# Patient Record
Sex: MALE | Race: WHITE | URBAN - METROPOLITAN AREA
[De-identification: names, ages, dates, MRNs, and addresses within clinical notes are randomized per-mention and may not be internally consistent; named-entity substitution may affect disease eponyms.]

---

## 2017-02-19 ENCOUNTER — TELEPHONE (OUTPATIENT)
Dept: FAMILY MEDICINE | Facility: CLINIC | Age: 52
End: 2017-02-19

## 2017-02-19 DIAGNOSIS — I10 HYPERTENSION GOAL BP (BLOOD PRESSURE) < 140/90: ICD-10-CM

## 2017-02-19 NOTE — LETTER
[address] [address]  Phone: [phone]   February 27, 2017    Diaz Sotelo  [address] [place] [id number]      Dear Diaz,    We have been trying to reach you regarding your recent refill request.     Per Dr. Teixeira: pt hasn't been seen here since 9/23/2015 - Please assist pt in making appt to be seen in the next month. Refill done x 1 month only. Pt needs recheck office visit before more refills. Please inform pt and Please assist pt in making appt to be seen - ok for a 15 min appt.     Sincerely,         Renee Teixeira M.D.

## 2017-02-20 RX ORDER — AMLODIPINE AND BENAZEPRIL HYDROCHLORIDE 5; 10 MG/1; MG/1
CAPSULE ORAL
Qty: 30 CAPSULE | Refills: 0 | Status: SHIPPED | OUTPATIENT
Start: 2017-02-20 | End: 2017-03-28

## 2017-02-20 NOTE — TELEPHONE ENCOUNTER
BP Readings from Last 3 Encounters:   09/23/15 124/76   02/16/15 (!) 130/96   01/05/15 136/86   pt hasn't been seen here since 9/23/2015 -  Please assist pt in making appt to be seen in the next month.   done x 1 month only. Pt needs recheck office visit before more refills. Please inform pt and  Please assist pt in making appt to be seen - ok for a 15 min appt.

## 2017-02-20 NOTE — TELEPHONE ENCOUNTER
amLODIPine-benazepril (LOTREL) 5-10 MG per capsule  Last Written Prescription Date: 3/29/2016  Last Fill Quantity: 30, # refills: 5  Last Office Visit with [facility] or [facility] prescribing provider: 9/23/2015       Potassium   Date Value Ref Range Status   09/23/2015 4.2 3.4 - 5.3 mmol/L Final     Creatinine   Date Value Ref Range Status   09/23/2015 0.95 0.66 - 1.25 mg/dL Final     BP Readings from Last 3 Encounters:   09/23/15 124/76   02/16/15 (!) 130/96   01/05/15 136/86       Due for an OV     Will fill for 30 days     Mojgan Harrison RN, BSN  [facility]

## 2017-03-28 ENCOUNTER — OFFICE VISIT (OUTPATIENT)
Dept: FAMILY MEDICINE | Facility: CLINIC | Age: 52
End: 2017-03-28
Payer: COMMERCIAL

## 2017-03-28 VITALS
OXYGEN SATURATION: 97 % | DIASTOLIC BLOOD PRESSURE: 80 MMHG | WEIGHT: 185 LBS | BODY MASS INDEX: 29.73 KG/M2 | SYSTOLIC BLOOD PRESSURE: 130 MMHG | TEMPERATURE: 98.8 F | HEART RATE: 90 BPM | HEIGHT: 66 IN

## 2017-03-28 DIAGNOSIS — I10 HYPERTENSION GOAL BP (BLOOD PRESSURE) < 140/90: Primary | ICD-10-CM

## 2017-03-28 DIAGNOSIS — Z12.11 SCREEN FOR COLON CANCER: ICD-10-CM

## 2017-03-28 DIAGNOSIS — F41.1 ANXIETY STATE: ICD-10-CM

## 2017-03-28 DIAGNOSIS — R42 DIZZINESS: ICD-10-CM

## 2017-03-28 DIAGNOSIS — Z11.59 NEED FOR HEPATITIS C SCREENING TEST: ICD-10-CM

## 2017-03-28 DIAGNOSIS — R63.5 WEIGHT GAIN: ICD-10-CM

## 2017-03-28 DIAGNOSIS — J30.1 SEASONAL ALLERGIC RHINITIS DUE TO POLLEN: ICD-10-CM

## 2017-03-28 DIAGNOSIS — Z12.5 SCREENING FOR PROSTATE CANCER: ICD-10-CM

## 2017-03-28 DIAGNOSIS — Z23 ENCOUNTER FOR IMMUNIZATION: ICD-10-CM

## 2017-03-28 PROCEDURE — 36415 COLL VENOUS BLD VENIPUNCTURE: CPT | Performed by: FAMILY MEDICINE

## 2017-03-28 PROCEDURE — 99214 OFFICE O/P EST MOD 30 MIN: CPT | Mod: 25 | Performed by: FAMILY MEDICINE

## 2017-03-28 PROCEDURE — 82043 UR ALBUMIN QUANTITATIVE: CPT | Performed by: FAMILY MEDICINE

## 2017-03-28 PROCEDURE — 90471 IMMUNIZATION ADMIN: CPT | Performed by: FAMILY MEDICINE

## 2017-03-28 PROCEDURE — 86803 HEPATITIS C AB TEST: CPT | Performed by: FAMILY MEDICINE

## 2017-03-28 PROCEDURE — 84443 ASSAY THYROID STIM HORMONE: CPT | Performed by: FAMILY MEDICINE

## 2017-03-28 PROCEDURE — 80048 BASIC METABOLIC PNL TOTAL CA: CPT | Performed by: FAMILY MEDICINE

## 2017-03-28 PROCEDURE — 90715 TDAP VACCINE 7 YRS/> IM: CPT | Performed by: FAMILY MEDICINE

## 2017-03-28 PROCEDURE — G0103 PSA SCREENING: HCPCS | Performed by: FAMILY MEDICINE

## 2017-03-28 RX ORDER — AMLODIPINE AND BENAZEPRIL HYDROCHLORIDE 5; 10 MG/1; MG/1
1 CAPSULE ORAL DAILY
Qty: 90 CAPSULE | Refills: 3 | Status: SHIPPED | OUTPATIENT
Start: 2017-03-28 | End: 2018-04-02

## 2017-03-28 RX ORDER — CETIRIZINE HYDROCHLORIDE 10 MG/1
10 TABLET ORAL DAILY
Qty: 90 TABLET | Refills: 1 | COMMUNITY
Start: 2017-03-28

## 2017-03-28 RX ORDER — FLUTICASONE PROPIONATE 50 MCG
2 SPRAY, SUSPENSION (ML) NASAL DAILY
Qty: 16 G | Refills: 3 | Status: SHIPPED | OUTPATIENT
Start: 2017-03-28 | End: 2018-04-12

## 2017-03-28 RX ORDER — LORAZEPAM 1 MG/1
TABLET ORAL
Qty: 10 TABLET | Refills: 0 | Status: SHIPPED | OUTPATIENT
Start: 2017-03-28 | End: 2018-04-12

## 2017-03-28 NOTE — LETTER
[address] [address]  [phone]        July 5, 2017    Diaz Sotelo  [address] [place] [address]              Dear Diaz Sotelo    This is to remind you that your fasting lab work is due.    You may call our office at [phone] to schedule an appointment.    Please disregard this notice if you have already had your labs drawn or made an appointment.        Sincerely,        Jimmie Henriquez MD

## 2017-03-28 NOTE — NURSING NOTE
"Chief Complaint   Patient presents with     Hypertension       Initial /80 (BP Location: Left arm, Patient Position: Chair, Cuff Size: Adult Large)  Pulse 90  Temp 98.8  F (37.1  C) (Oral)  Ht 5' 6\" (1.676 m)  Wt 185 lb (83.9 kg)  SpO2 97%  BMI 29.86 kg/m2 Estimated body mass index is 29.86 kg/(m^2) as calculated from the following:    Height as of this encounter: 5' 6\" (1.676 m).    Weight as of this encounter: 185 lb (83.9 kg).  Medication Reconciliation: complete  "

## 2017-03-28 NOTE — MR AVS SNAPSHOT
After Visit Summary   3/28/2017    Diaz Sotelo    MRN: [id number]           Patient Information     Date Of Birth          1965        Visit Information        Provider Department      3/28/2017 3:00 PM Jimmie Henriquez MD [facility]        Today's Diagnoses     Hypertension goal BP (blood pressure) < 140/90    -  1    Screen for colon cancer        Need for hepatitis C screening test        Acute recurrent maxillary sinusitis        Dizziness        Screening for prostate cancer        Weight gain        Seasonal allergic rhinitis due to pollen        Anxiety state           Follow-ups after your visit        Follow-up notes from your care team     Return in about 6 months (around 9/28/2017) for Wellness Exam and fasting labs.      Future tests that were ordered for you today     Open Future Orders        Priority Expected Expires Ordered    Fecal colorectal cancer screen (FIT) Routine 4/18/2017 6/20/2017 3/28/2017    Lipid panel reflex to direct LDL - FUTURE  S+90 Routine  6/26/2017 3/28/2017            Who to contact     If you have questions or need follow up information about today's clinic visit or your schedule please contact [facility] directly at [phone].  Normal or non-critical lab and imaging results will be communicated to you by CityHookhart, letter or phone within 4 business days after the clinic has received the results. If you do not hear from us within 7 days, please contact the clinic through CityHookhart or phone. If you have a critical or abnormal lab result, we will notify you by phone as soon as possible.  Submit refill requests through milabent or call your pharmacy and they will forward the refill request to us. Please allow 3 business days for your refill to be completed.          Additional Information About Your Visit        MyChart Information     milabent gives you secure access to your electronic health record. If you see a primary care  "provider, you can also send messages to your care team and make appointments. If you have questions, please call your primary care clinic.  If you do not have a primary care provider, please call [phone] and they will assist you.        Care EveryWhere ID     This is your Care EveryWhere ID. This could be used by other organizations to access your [place] medical records  [id number]        Your Vitals Were     Pulse Temperature Height Pulse Oximetry BMI (Body Mass Index)       90 98.8  F (37.1  C) (Oral) 5' 6\" (1.676 m) 97% 29.86 kg/m2        Blood Pressure from Last 3 Encounters:   03/28/17 130/80   09/23/15 124/76   02/16/15 (!) 130/96    Weight from Last 3 Encounters:   03/28/17 185 lb (83.9 kg)   09/23/15 178 lb (80.7 kg)   04/09/15 181 lb (82.1 kg)              We Performed the Following     Albumin Random Urine Quantitative     BASIC METABOLIC PANEL     Hepatitis C Screen Reflex to HCV RNA Quant and Genotype     PROSTATE SPEC ANTIGEN SCREEN     TSH with free T4 reflex          Today's Medication Changes          These changes are accurate as of: 3/28/17  3:36 PM.  If you have any questions, ask your nurse or doctor.               These medicines have changed or have updated prescriptions.        Dose/Directions    amLODIPine-benazepril 5-10 MG per capsule   Commonly known as:  LOTREL   This may have changed:  See the new instructions.   Used for:  Hypertension goal BP (blood pressure) < 140/90   Changed by:  Jimmie Henriquez MD        Dose:  1 capsule   Take 1 capsule by mouth daily   Quantity:  90 capsule   Refills:  3       cetirizine 10 MG tablet   Commonly known as:  ZYRTEC   This may have changed:  See the new instructions.   Used for:  Seasonal allergic rhinitis due to pollen   Changed by:  Jimmie Henriquez MD        Dose:  10 mg   Take 1 tablet (10 mg) by mouth daily   Quantity:  90 tablet   Refills:  1         Stop taking these medicines if you haven't already. Please contact your care team " if you have questions.     azithromycin 250 MG tablet   Commonly known as:  ZITHROMAX   Stopped by:  Jimmie Henriquez MD                Where to get your medicines      These medications were sent to [id number]  [place] [address]  [address] [place] [address]     Phone:  [phone]     amLODIPine-benazepril 5-10 MG per capsule    fluticasone 50 MCG/ACT spray         Some of these will need a paper prescription and others can be bought over the counter.  Ask your nurse if you have questions.     Bring a paper prescription for each of these medications     LORazepam 1 MG tablet       You don't need a prescription for these medications     cetirizine 10 MG tablet                Primary Care Provider Office Phone # Fax #    Renee Teixeira [phone]       [facility] [address] [place] [address]        Thank you!     Thank you for choosing [facility]  for your care. Our goal is always to provide you with excellent care. Hearing back from our patients is one way we can continue to improve our services. Please take a few minutes to complete the written survey that you may receive in the mail after your visit with us. Thank you!             Your Updated Medication List - Protect others around you: Learn how to safely use, store and throw away your medicines at www.disposemymeds.org.          This list is accurate as of: 3/28/17  3:36 PM.  Always use your most recent med list.                   Brand Name Dispense Instructions for use    amLODIPine-benazepril 5-10 MG per capsule    LOTREL    90 capsule    Take 1 capsule by mouth daily       aspirin 81 MG tablet     100 Tab    take 1 Tab by mouth daily.       cetirizine 10 MG tablet    ZYRTEC    90 tablet    Take 1 tablet (10 mg) by mouth daily       fluticasone 50 MCG/ACT spray    FLONASE    16 g    Spray 2 sprays into both nostrils daily       LORazepam  1 MG tablet    ATIVAN    10 tablet    TAKE ONE TABLET BY MOUTH EVERY 8 HOURS AS NEEDED FOR SEVERE ANXIETY

## 2017-03-28 NOTE — PROGRESS NOTES
SUBJECTIVE:                                                    Diaz Sotelo is a 51 year old male who presents to clinic today for the following health issues:    Hypertension Follow-up  Amlodipine-benazepril 5-10 mg qd.    Outpatient blood pressures are not being checked.    Low Salt Diet: no added salt    No side effects on medication      Anxiety Follow-Up  Lorazepam 1 mg prn     Status since last visit: takes as needed    Other associated symptoms:None    Complicating factors:   Significant life event: No   Current substance abuse: None  Depression symptoms: No  CAILIN-7 SCORE 3/6/2014 1/5/2017   Total Score 7 -   Total Score - 3        GAD7       Anxiety  His anxiety triggers are social.     Allergies  Patient relates his allergies have worsened. His ears get plugged causing vertigo sensations. He notes some left ear pain as well. He alleviates allergies with zyrtec and Flonase.     Fatigue  Patient mentions his energy has diminished. He inquires whether this could be due to low testosterone levels. He has libido, desire and is able to get erection.     Weight gain  He has gradually been gaining weight. He mentions he tries to eat healthy and portion control. He inquires if weight gain is secondary to amlodipine.      Amount of exercise or physical activity: walking- 2 miles    Problems taking medications regularly: No    Medication side effects: none    Diet: low salt    Problem list and histories reviewed & adjusted, as indicated.  Additional history: as documented    ROS:  Constitutional, HEENT, cardiovascular, pulmonary, GI, , musculoskeletal, neuro, skin, endocrine and psych systems are negative, except as otherwise noted.    This document serves as a record of the services and decisions personally performed and made by Jimmie Henriquez MD. It was created on his behalf by Deisi Ye, a trained medical scribe. The creation of this document is based on the provider's statements to the medical  "borisvinod Ye 3:34 PM March 28, 2017    OBJECTIVE:                                                    /80 (BP Location: Left arm, Patient Position: Chair, Cuff Size: Adult Large)  Pulse 90  Temp 98.8  F (37.1  C) (Oral)  Ht 1.676 m (5' 6\")  Wt 83.9 kg (185 lb)  SpO2 97%  BMI 29.86 kg/m2 Body mass index is 29.86 kg/(m^2).   GENERAL: healthy, alert, well nourished, well hydrated, no distress  HENT: ear canals- normal; TMs- normal; Nose- normal; Mouth- no ulcers, no lesions  NECK: no tenderness, no adenopathy, no asymmetry, no masses, no stiffness; thyroid- normal to palpation  RESP: lungs clear to auscultation - no rales, no rhonchi, no wheezes  CV: regular rates and rhythm, normal S1 S2, no S3 or S4 and no murmur, no click or rub -  ABDOMEN: soft, no tenderness, no  hepatosplenomegaly, no masses, normal bowel sounds  MS: extremities- no gross deformities noted, no edema  NEURO: strength and tone- normal, sensory exam- grossly normal, mentation- intact, speech- normal, reflexes- symmetric  BACK: no CVA tenderness, no paralumbar tenderness  PSYCH: Alert and oriented times 3; speech- coherent , normal rate and volume; able to articulate logical thoughts, able to abstract reason, no tangential thoughts, no hallucinations or delusions, affect- normal    Diagnostic test results:  No results found for this or any previous visit (from the past 24 hour(s)).     ASSESSMENT/PLAN:         Diaz was seen today for hypertension.    Diagnoses and all orders for this visit:    Hypertension goal BP (blood pressure) < 140/90  Controlled, continue medications  -     BASIC METABOLIC PANEL  -     Albumin Random Urine Quantitative  -     Lipid panel reflex to direct LDL - FUTURE  S+90; Future  -     amLODIPine-benazepril (LOTREL) 5-10 MG per capsule; Take 1 capsule by mouth daily    Dizziness  -     fluticasone (FLONASE) 50 MCG/ACT spray; Spray 2 sprays into both nostrils daily    Weight gain  Discussed healthy " diet and start regular exercise regimen.   -     TSH with free T4 reflex    Seasonal allergic rhinitis due to pollen  Symptoms worsened, refills provided.  -     cetirizine (ZYRTEC) 10 MG tablet; Take 1 tablet (10 mg) by mouth daily    Anxiety state  Stable, continue medications.   -     LORazepam (ATIVAN) 1 MG tablet; TAKE ONE TABLET BY MOUTH EVERY 8 HOURS AS NEEDED FOR SEVERE ANXIETY    Encounter for immunization  Updating vaccines.   -     VACCINE ADMINISTRATION, INITIAL  -     TDAP VACCINE (ADACEL)    Screen for colon cancer  Yearly screen. Patient not interested in colonoscopy at this time.  -     Fecal colorectal cancer screen (FIT); Future    Need for hepatitis C screening test  One time screening. Patient born in 1965.  -     Hepatitis C Screen Reflex to HCV RNA Quant and Genotype    Screening for prostate cancer  Yearly screening.   -     PROSTATE SPEC ANTIGEN SCREEN    Risks, benefits and alternatives of treatments discussed. Plan agreed on.      Followup: 6-12 months for a physical     Will call, return to clinic, or go to ED if worsening or symptoms not improving as discussed.    See patient instructions.       Health Maintenance Topics with due status: Overdue       Topic Date Due    WELLNESS VISIT Q1 YR (NO INBASKET) 1965    COLONOSCOPY Q10 YR INBASKET MESSAGE 06/28/1974    FIT Q1 YR (NO INBASKET) 06/28/1974    HEPATITIS C SCREENING 06/28/1982    MICROALBUMIN Q1 YEAR( NO INBASKET) 02/16/2016    LIPID MONITORING Q5 YEARS (NO INBASKET) 04/26/2016    BMP Q1 YR (NO INBASKET) 09/23/2016    PSA Q1 YR 09/23/2016       Health maintenance reviewed/updated? Yes    The information in this document, created by the medical scribe for me, accurately reflects the services I personally performed and the decisions made by me. I have reviewed and approved this document for accuracy prior to leaving the patient care area.  March 28, 2017 3:34 PM        Moe Henriquez MD

## 2017-03-29 LAB
ANION GAP SERPL CALCULATED.3IONS-SCNC: 10 MMOL/L (ref 3–14)
BUN SERPL-MCNC: 13 MG/DL (ref 7–30)
CALCIUM SERPL-MCNC: 9 MG/DL (ref 8.5–10.1)
CHLORIDE SERPL-SCNC: 106 MMOL/L (ref 94–109)
CO2 SERPL-SCNC: 27 MMOL/L (ref 20–32)
CREAT SERPL-MCNC: 1.02 MG/DL (ref 0.66–1.25)
CREAT UR-MCNC: 249 MG/DL
GFR SERPL CREATININE-BSD FRML MDRD: 76 ML/MIN/1.7M2
GLUCOSE SERPL-MCNC: 86 MG/DL (ref 70–99)
HCV AB SERPL QL IA: NORMAL
MICROALBUMIN UR-MCNC: 12 MG/L
MICROALBUMIN/CREAT UR: 4.78 MG/G CR (ref 0–17)
POTASSIUM SERPL-SCNC: 4 MMOL/L (ref 3.4–5.3)
PSA SERPL-ACNC: 0.42 UG/L (ref 0–4)
SODIUM SERPL-SCNC: 143 MMOL/L (ref 133–144)
TSH SERPL DL<=0.005 MIU/L-ACNC: 1.99 MU/L (ref 0.4–4)

## 2017-04-28 PROCEDURE — 82274 ASSAY TEST FOR BLOOD FECAL: CPT | Performed by: FAMILY MEDICINE

## 2017-05-02 DIAGNOSIS — Z12.11 SCREEN FOR COLON CANCER: ICD-10-CM

## 2017-05-02 LAB — HEMOCCULT STL QL IA: NEGATIVE

## 2017-08-10 ENCOUNTER — TELEPHONE (OUTPATIENT)
Dept: LAB | Facility: CLINIC | Age: 52
End: 2017-08-10

## 2017-08-10 NOTE — TELEPHONE ENCOUNTER
Labs on 3/28/2017 have  and a letter was sent on 2017. Patient has not followed up. Routing to team to address.    [facility]

## 2017-10-03 ENCOUNTER — TRANSFERRED RECORDS (OUTPATIENT)
Dept: HEALTH INFORMATION MANAGEMENT | Facility: CLINIC | Age: 52
End: 2017-10-03

## 2017-10-24 ENCOUNTER — TRANSFERRED RECORDS (OUTPATIENT)
Dept: HEALTH INFORMATION MANAGEMENT | Facility: CLINIC | Age: 52
End: 2017-10-24

## 2018-04-02 ENCOUNTER — OFFICE VISIT (OUTPATIENT)
Dept: FAMILY MEDICINE | Facility: CLINIC | Age: 53
End: 2018-04-02
Payer: COMMERCIAL

## 2018-04-02 VITALS
TEMPERATURE: 98.4 F | DIASTOLIC BLOOD PRESSURE: 82 MMHG | WEIGHT: 188 LBS | HEIGHT: 66 IN | SYSTOLIC BLOOD PRESSURE: 148 MMHG | OXYGEN SATURATION: 98 % | BODY MASS INDEX: 30.22 KG/M2 | HEART RATE: 102 BPM

## 2018-04-02 DIAGNOSIS — I10 HYPERTENSION GOAL BP (BLOOD PRESSURE) < 140/90: Primary | ICD-10-CM

## 2018-04-02 DIAGNOSIS — F41.1 ANXIETY STATE: ICD-10-CM

## 2018-04-02 DIAGNOSIS — I10 HYPERTENSION GOAL BP (BLOOD PRESSURE) < 140/90: ICD-10-CM

## 2018-04-02 PROCEDURE — 99213 OFFICE O/P EST LOW 20 MIN: CPT | Performed by: PHYSICIAN ASSISTANT

## 2018-04-02 RX ORDER — AMLODIPINE AND BENAZEPRIL HYDROCHLORIDE 5; 10 MG/1; MG/1
CAPSULE ORAL
Qty: 30 CAPSULE | Refills: 0 | Status: SHIPPED | OUTPATIENT
Start: 2018-04-02 | End: 2018-04-12

## 2018-04-02 NOTE — MR AVS SNAPSHOT
After Visit Summary   4/2/2018    Diaz Sotelo    MRN: [id number]           Patient Information     Date Of Birth          1965        Visit Information        Provider Department      4/2/2018 6:00 PM Rosanna Castillo PA-C [facility] Instructions    Please monitor your blood pressure outside of clinic and record the readings.     Please followup in the next 4 weeks for the wellness visit and we will do the labs then.      Please be seen sooner if needed.           Follow-ups after your visit        Follow-up notes from your care team     Return in about 4 weeks (around 4/30/2018) for Physical Exam, Routine Visit.      Who to contact     If you have questions or need follow up information about today's clinic visit or your schedule please contact [facility] directly at [phone].  Normal or non-critical lab and imaging results will be communicated to you by MyChart, letter or phone within 4 business days after the clinic has received the results. If you do not hear from us within 7 days, please contact the clinic through SiNode Systemshart or phone. If you have a critical or abnormal lab result, we will notify you by phone as soon as possible.  Submit refill requests through Graffiti or call your pharmacy and they will forward the refill request to us. Please allow 3 business days for your refill to be completed.          Additional Information About Your Visit        MyChart Information     Graffiti gives you secure access to your electronic health record. If you see a primary care provider, you can also send messages to your care team and make appointments. If you have questions, please call your primary care clinic.  If you do not have a primary care provider, please call [phone] and they will assist you.        Care EveryWhere ID     This is your Care EveryWhere ID. This could be used by other organizations to access your [facility]  "records  [id number]        Your Vitals Were     Pulse Temperature Height Pulse Oximetry BMI (Body Mass Index)       102 98.4  F (36.9  C) (Oral) 5' 6\" (1.676 m) 98% 30.34 kg/m2        Blood Pressure from Last 3 Encounters:   04/02/18 148/82   03/28/17 130/80   09/23/15 124/76    Weight from Last 3 Encounters:   04/02/18 188 lb (85.3 kg)   03/28/17 185 lb (83.9 kg)   09/23/15 178 lb (80.7 kg)              Today, you had the following     No orders found for display         Today's Medication Changes          These changes are accurate as of 4/2/18  6:39 PM.  If you have any questions, ask your nurse or doctor.               These medicines have changed or have updated prescriptions.        Dose/Directions    amLODIPine-benazepril 5-10 MG per capsule   Commonly known as:  LOTREL   This may have changed:  See the new instructions.   Used for:  Hypertension goal BP (blood pressure) < 140/90   Changed by:  Jimmie Henriquez MD        TAKE ONE CAPSULE BY MOUTH ONCE DAILY   Quantity:  30 capsule   Refills:  0            Where to get your medicines      These medications were sent to [facility] [address] [place] [address] [address] [facility] [address]  [address] [facility] [address]     Phone:  [phone]     amLODIPine-benazepril 5-10 MG per capsule                Primary Care Provider Office Phone # Fax #    Renee Teixeira [phone]       [address] [place] [address]        Equal Access to Services     [facility]: Hadii gonzalo escobar hadasho Soomaali, waaxda luqadaha, qaybta kaalmada adeegyanorah, waxay idiin hayaan adeeg kharash la'aan . So [facility] [phone].    ATENCIÓN: Si habla español, tiene a arzate disposición servicios gratuitos de asistencia lingüística. Llame al [phone].    We comply with applicable federal civil rights laws and [place] laws. We do not discriminate on the basis of race, color, national origin, age, disability, sex, sexual orientation, or gender " identity.            Thank you!     Thank you for choosing [facility]  for your care. Our goal is always to provide you with excellent care. Hearing back from our patients is one way we can continue to improve our services. Please take a few minutes to complete the written survey that you may receive in the mail after your visit with us. Thank you!             Your Updated Medication List - Protect others around you: Learn how to safely use, store and throw away your medicines at www.disposemymeds.org.          This list is accurate as of 4/2/18  6:39 PM.  Always use your most recent med list.                   Brand Name Dispense Instructions for use Diagnosis    amLODIPine-benazepril 5-10 MG per capsule    LOTREL    30 capsule    TAKE ONE CAPSULE BY MOUTH ONCE DAILY    Hypertension goal BP (blood pressure) < 140/90       aspirin 81 MG tablet     100 Tab    take 1 Tab by mouth daily.    Essential hypertension, benign       cetirizine 10 MG tablet    ZYRTEC    90 tablet    Take 1 tablet (10 mg) by mouth daily    Seasonal allergic rhinitis due to pollen       fluticasone 50 MCG/ACT spray    FLONASE    16 g    Spray 2 sprays into both nostrils daily    Dizziness       LORazepam 1 MG tablet    ATIVAN    10 tablet    TAKE ONE TABLET BY MOUTH EVERY 8 HOURS AS NEEDED FOR SEVERE ANXIETY    Anxiety state

## 2018-04-02 NOTE — PROGRESS NOTES
"  SUBJECTIVE:                                                    Diaz Sotelo is a 52 year old male who presents to clinic today for the following health issues:      Hypertension Follow-up      Outpatient blood pressures are not being checked.    Low Salt Diet: no added salt      Amount of exercise or physical activity: active at work -  of cars    Problems taking medications regularly: No    Medication side effects: weight gain    Diet: regular (no restrictions)      Patient reports that he takes the Ativan only as needed.  He states that he got an Rx for 10 tablets last year, and still has three tablets left.  He only takes it in severe anxiety situations.  He reports that he tolerates the medication well and does not have side effects from it.  He reports that he generally takes about 1/2 tab when he does take it.      BP Readings from Last 6 Encounters:   04/02/18 148/82   03/28/17 130/80   09/23/15 124/76   02/16/15 (!) 130/96   01/05/15 136/86   11/18/14 130/80     Problem list and histories reviewed & adjusted, as indicated.  Additional history: as documented    ROS:  Constitutional, HEENT, cardiovascular, pulmonary, GI, , musculoskeletal, neuro, skin, endocrine and psych systems are negative, except as otherwise noted.    OBJECTIVE:                                                    /82 (BP Location: Left arm, Patient Position: Chair, Cuff Size: Adult Regular)  Pulse 102  Temp 98.4  F (36.9  C) (Oral)  Ht 5' 6\" (1.676 m)  Wt 188 lb (85.3 kg)  SpO2 98%  BMI 30.34 kg/m2  Body mass index is 30.34 kg/(m^2).  GENERAL: healthy, alert and no distress  EYES: Eyes grossly normal to inspection, PERRL and conjunctivae and sclerae normal  RESP: lungs clear to auscultation - no rales, rhonchi or wheezes  CV: regular rate and rhythm, normal S1 S2, no S3 or S4, no murmur, click or rub, no peripheral edema and peripheral pulses strong  MS: no gross musculoskeletal defects noted, no edema  NEURO: " Normal strength and tone, mentation intact and speech normal  PSYCH: mentation appears normal, affect normal/bright    Diagnostic Test Results:  Labs - declined by patient, he does not have time.  Patient is willing to set a routine physical exam within the month to have labs done then.       ASSESSMENT/PLAN:                                                      Diaz was seen today for recheck medication.    Diagnoses and all orders for this visit:    Hypertension goal BP (blood pressure) < 140/90    Anxiety state    - Patient's BP is mildly elevated today.  Patient reports that he does not typically have issues with elevated BP, but does feel nervous tonight as he is seeing a new provider today and does not have much time for the appointment tonight.    - I rechecked the BP and got a reading of 148/80.  I have advised that the patient check his blood pressure outside of clinic for the next 1-2 weeks and record the readings.  He has also been asked to followup in the pharmacy for a BP check in about one week.    - BP medication has been renewed today by Dr. Henriquez for a one month supply, therefore medication was not renewed today at appointment.  Patient has been advised to followup for a physical and lab work in the next month for further refills.    - The Ativan was not renewed today as patient has tablets left.  He was advised to contact the clinic or pharmacy when he is due for refills.      -- I have discussed the patient's diagnosis, and my plan of treatment with the patient and/or family. Patient is aware to followup if symptoms do not improve.  Patient has been advised to be seen sooner or seek more immediate care if symptoms change or worsen.  Patient agrees with and understands the plan today.     See Patient Instructions        Rosanna Castillo PA-C    [facility]

## 2018-04-02 NOTE — TELEPHONE ENCOUNTER
"Requested Prescriptions   Pending Prescriptions Disp Refills     amLODIPine-benazepril (LOTREL) 5-10 MG per capsule [Pharmacy Med Name: AMLOD/BENAZP 5-10MG  CAP] 30 capsule 11      Last Written Prescription Date:  03/28/2017  Last Fill Quantity: 90,  # refills: 3   Last office visit: 3/28/2017   Next 5 appointments (look out 90 days)     Apr 02, 2018  6:00 PM CDT   [facility] with Rosanna Castillo [facility]-C   [facility]    [address] [place] [address] [id number] [phone]                  Sig: TAKE ONE CAPSULE BY MOUTH ONCE DAILY    Calcium Channel Blockers Protocol  Failed    4/2/2018  9:13 AM       Failed - Blood pressure under 140/90 in past 12 months    BP Readings from Last 3 Encounters:   03/28/17 130/80   09/23/15 124/76   02/16/15 (!) 130/96                Failed - Recent (12 mo) or future (30 days) visit within the authorizing provider's specialty    Patient had office visit in the last 12 months or has a visit in the next 30 days with authorizing provider or within the authorizing provider's specialty.  See \"Patient Info\" tab in inbasket, or \"Choose Columns\" in Meds & Orders section of the refill encounter.           Failed - Normal serum creatinine on file in past 12 months    Recent Labs   Lab Test  03/28/17   1548   CR  1.02            Passed - Patient is age 18 or older          "

## 2018-04-02 NOTE — TELEPHONE ENCOUNTER
Pt scheduled for today with KR.   Medication is being filled for 1 time refill only due to:  Patient needs to be seen because due for appt .   Bhavna Stephens RN  [facility]

## 2018-04-02 NOTE — NURSING NOTE
"Chief Complaint   Patient presents with     Recheck Medication       Initial /82 (BP Location: Left arm, Patient Position: Chair, Cuff Size: Adult Regular)  Pulse 102  Temp 98.4  F (36.9  C) (Oral)  Ht 5' 6\" (1.676 m)  Wt 188 lb (85.3 kg)  SpO2 98%  BMI 30.34 kg/m2 Estimated body mass index is 30.34 kg/(m^2) as calculated from the following:    Height as of this encounter: 5' 6\" (1.676 m).    Weight as of this encounter: 188 lb (85.3 kg).  Medication Reconciliation: complete   Csaba Mlnarik CMA    "
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
## 2018-04-11 ENCOUNTER — MYC REFILL (OUTPATIENT)
Dept: FAMILY MEDICINE | Facility: CLINIC | Age: 53
End: 2018-04-11

## 2018-04-11 DIAGNOSIS — F41.1 ANXIETY STATE: ICD-10-CM

## 2018-04-11 RX ORDER — LORAZEPAM 1 MG/1
TABLET ORAL
Qty: 10 TABLET | Refills: 0 | Status: CANCELLED | OUTPATIENT
Start: 2018-04-11

## 2018-04-12 ENCOUNTER — OFFICE VISIT (OUTPATIENT)
Dept: FAMILY MEDICINE | Facility: CLINIC | Age: 53
End: 2018-04-12
Payer: COMMERCIAL

## 2018-04-12 VITALS
SYSTOLIC BLOOD PRESSURE: 132 MMHG | OXYGEN SATURATION: 97 % | DIASTOLIC BLOOD PRESSURE: 76 MMHG | RESPIRATION RATE: 12 BRPM | BODY MASS INDEX: 29.89 KG/M2 | HEART RATE: 90 BPM | HEIGHT: 66 IN | WEIGHT: 186 LBS | TEMPERATURE: 97.9 F

## 2018-04-12 DIAGNOSIS — Z83.49 FAMILY HISTORY OF THYROID DISEASE: ICD-10-CM

## 2018-04-12 DIAGNOSIS — F41.1 ANXIETY STATE: ICD-10-CM

## 2018-04-12 DIAGNOSIS — K21.9 GASTROESOPHAGEAL REFLUX DISEASE, ESOPHAGITIS PRESENCE NOT SPECIFIED: ICD-10-CM

## 2018-04-12 DIAGNOSIS — R42 DIZZINESS: ICD-10-CM

## 2018-04-12 DIAGNOSIS — Z00.00 ROUTINE GENERAL MEDICAL EXAMINATION AT A HEALTH CARE FACILITY: Primary | ICD-10-CM

## 2018-04-12 DIAGNOSIS — Z12.5 SCREENING FOR PROSTATE CANCER: ICD-10-CM

## 2018-04-12 DIAGNOSIS — Z12.11 SCREENING FOR COLON CANCER: ICD-10-CM

## 2018-04-12 DIAGNOSIS — Z13.6 CARDIOVASCULAR SCREENING; LDL GOAL LESS THAN 130: ICD-10-CM

## 2018-04-12 DIAGNOSIS — R63.5 WEIGHT GAIN: ICD-10-CM

## 2018-04-12 DIAGNOSIS — I10 HYPERTENSION GOAL BP (BLOOD PRESSURE) < 140/90: ICD-10-CM

## 2018-04-12 PROCEDURE — 99396 PREV VISIT EST AGE 40-64: CPT | Performed by: FAMILY MEDICINE

## 2018-04-12 RX ORDER — AMLODIPINE AND BENAZEPRIL HYDROCHLORIDE 5; 10 MG/1; MG/1
1 CAPSULE ORAL DAILY
Qty: 90 CAPSULE | Refills: 3 | Status: SHIPPED | OUTPATIENT
Start: 2018-04-12 | End: 2019-04-14

## 2018-04-12 RX ORDER — FLUTICASONE PROPIONATE 50 MCG
2 SPRAY, SUSPENSION (ML) NASAL DAILY
Qty: 16 G | Refills: 3 | Status: SHIPPED | OUTPATIENT
Start: 2018-04-12 | End: 2019-05-15

## 2018-04-12 RX ORDER — LORAZEPAM 1 MG/1
TABLET ORAL
Qty: 10 TABLET | Refills: 0 | Status: SHIPPED | OUTPATIENT
Start: 2018-04-12 | End: 2019-01-03

## 2018-04-12 ASSESSMENT — ANXIETY QUESTIONNAIRES
2. NOT BEING ABLE TO STOP OR CONTROL WORRYING: SEVERAL DAYS
6. BECOMING EASILY ANNOYED OR IRRITABLE: NOT AT ALL
6. BECOMING EASILY ANNOYED OR IRRITABLE: NOT AT ALL
7. FEELING AFRAID AS IF SOMETHING AWFUL MIGHT HAPPEN: NOT AT ALL
3. WORRYING TOO MUCH ABOUT DIFFERENT THINGS: SEVERAL DAYS
GAD7 TOTAL SCORE: 3
5. BEING SO RESTLESS THAT IT IS HARD TO SIT STILL: NOT AT ALL
2. NOT BEING ABLE TO STOP OR CONTROL WORRYING: SEVERAL DAYS
GAD7 TOTAL SCORE: 3
7. FEELING AFRAID AS IF SOMETHING AWFUL MIGHT HAPPEN: NOT AT ALL
1. FEELING NERVOUS, ANXIOUS, OR ON EDGE: SEVERAL DAYS
1. FEELING NERVOUS, ANXIOUS, OR ON EDGE: SEVERAL DAYS
5. BEING SO RESTLESS THAT IT IS HARD TO SIT STILL: NOT AT ALL
IF YOU CHECKED OFF ANY PROBLEMS ON THIS QUESTIONNAIRE, HOW DIFFICULT HAVE THESE PROBLEMS MADE IT FOR YOU TO DO YOUR WORK, TAKE CARE OF THINGS AT HOME, OR GET ALONG WITH OTHER PEOPLE: SOMEWHAT DIFFICULT
3. WORRYING TOO MUCH ABOUT DIFFERENT THINGS: SEVERAL DAYS

## 2018-04-12 ASSESSMENT — PATIENT HEALTH QUESTIONNAIRE - PHQ9
5. POOR APPETITE OR OVEREATING: NOT AT ALL
5. POOR APPETITE OR OVEREATING: NOT AT ALL

## 2018-04-12 NOTE — NURSING NOTE
"Chief Complaint   Patient presents with     Physical       Initial /76  Pulse 90  Temp 97.9  F (36.6  C) (Tympanic)  Resp 12  Ht 5' 6\" (1.676 m)  Wt 186 lb (84.4 kg)  SpO2 97%  BMI 30.02 kg/m2 Estimated body mass index is 30.02 kg/(m^2) as calculated from the following:    Height as of this encounter: 5' 6\" (1.676 m).    Weight as of this encounter: 186 lb (84.4 kg).  Medication Reconciliation: complete   Anastasia Bloedow LPN    "

## 2018-04-12 NOTE — MR AVS SNAPSHOT
After Visit Summary   4/12/2018    Diaz Sotelo    MRN: [id number]           Patient Information     Date Of Birth          1965        Visit Information        Provider Department      4/12/2018 4:00 PM Jimmie Henriquez MD [facility] [place]        Today's Diagnoses     Routine general medical examination at a health care facility    -  1    Hypertension goal BP (blood pressure) < 140/90        Anxiety state        Dizziness        Gastroesophageal reflux disease, esophagitis presence not specified        Screening for prostate cancer        Weight gain        Family history of thyroid disease        Screening for colon cancer        CARDIOVASCULAR SCREENING; LDL GOAL LESS THAN 130          Care Instructions      Preventive Health Recommendations  Male Ages 50 - 64    Yearly exam:             See your health care provider every year in order to  o   Review health changes.   o   Discuss preventive care.    o   Review your medicines if your doctor has prescribed any.     Have a cholesterol test every 5 years, or more frequently if you are at risk for high cholesterol/heart disease.     Have a diabetes test (fasting glucose) every three years. If you are at risk for diabetes, you should have this test more often.     Have a colonoscopy at age 50, or have a yearly FIT test (stool test). These exams will check for colon cancer.      Talk with your health care provider about whether or not a prostate cancer screening test (PSA) is right for you.    You should be tested each year for STDs (sexually transmitted diseases), if you re at risk.     Shots: Get a flu shot each year. Get a tetanus shot every 10 years.     Nutrition:    Eat at least 5 servings of fruits and vegetables daily.     Eat whole-grain bread, whole-wheat pasta and brown rice instead of white grains and rice.     Talk to your provider about Calcium and Vitamin D.     Lifestyle    Exercise for at least 150 minutes a week (30  minutes a day, 5 days a week). This will help you control your weight and prevent disease.     Limit alcohol to one drink per day.     No smoking.     Wear sunscreen to prevent skin cancer.     See your dentist every six months for an exam and cleaning.     See your eye doctor every 1 to 2 years.            Follow-ups after your visit        Follow-up notes from your care team     Return in about 1 week (around 4/19/2018) for Lab Work.      Future tests that were ordered for you today     Open Future Orders        Priority Expected Expires Ordered    Comprehensive metabolic panel Routine 5/12/2018 5/12/2018 4/12/2018    CBC with platelets Routine 5/12/2018 5/12/2018 4/12/2018    Lipid panel reflex to direct LDL Fasting Routine 5/12/2018 5/12/2018 4/12/2018    Prostate spec antigen screen Routine 5/12/2018 5/12/2018 4/12/2018    TSH with free T4 reflex Routine  5/12/2018 4/12/2018    Fecal colorectal cancer screen (FIT) Routine 5/3/2018 7/5/2018 4/12/2018            Who to contact     If you have questions or need follow up information about today's clinic visit or your schedule please contact [facility] directly at [phone].  Normal or non-critical lab and imaging results will be communicated to you by Amityhart, letter or phone within 4 business days after the clinic has received the results. If you do not hear from us within 7 days, please contact the clinic through Amityhart or phone. If you have a critical or abnormal lab result, we will notify you by phone as soon as possible.  Submit refill requests through eTobb or call your pharmacy and they will forward the refill request to us. Please allow 3 business days for your refill to be completed.          Additional Information About Your Visit        Amityhar[employer] Buzz Network Information     eTobb gives you secure access to your electronic health record. If you see a primary care provider, you can also send messages to your care team and make appointments. If  "you have questions, please call your primary care clinic.  If you do not have a primary care provider, please call [phone] and they will assist you.        Care EveryWhere ID     This is your Care EveryWhere ID. This could be used by other organizations to access your [facility] medical records  [id number]        Your Vitals Were     Pulse Temperature Respirations Height Pulse Oximetry BMI (Body Mass Index)    90 97.9  F (36.6  C) (Tympanic) 12 5' 6\" (1.676 m) 97% 30.02 kg/m2       Blood Pressure from Last 3 Encounters:   04/12/18 132/76   04/02/18 148/82   03/28/17 130/80    Weight from Last 3 Encounters:   04/12/18 186 lb (84.4 kg)   04/02/18 188 lb (85.3 kg)   03/28/17 185 lb (83.9 kg)                 Today's Medication Changes          These changes are accurate as of 4/12/18  4:56 PM.  If you have any questions, ask your nurse or doctor.               These medicines have changed or have updated prescriptions.        Dose/Directions    amLODIPine-benazepril 5-10 MG per capsule   Commonly known as:  LOTREL   This may have changed:  See the new instructions.   Used for:  Hypertension goal BP (blood pressure) < 140/90   Changed by:  Jimmie Henriquez MD        Dose:  1 capsule   Take 1 capsule by mouth daily   Quantity:  90 capsule   Refills:  3            Where to get your medicines      These medications were sent to [facility] [address] [place] [address] [address] [place] [address]  [address] [facility] [address]     Phone:  [phone]     amLODIPine-benazepril 5-10 MG per capsule    fluticasone 50 MCG/ACT spray         Some of these will need a paper prescription and others can be bought over the counter.  Ask your nurse if you have questions.     Bring a paper prescription for each of these medications     LORazepam 1 MG tablet                Primary Care Provider Office Phone # Fax #    Renee Teixeira [phone]       [address] [place] [address]      "   Equal Access to Services     [facility] : Hadii aad ku hadlevgillian Hussainali, wadalyda luqadaha, qaybta kajacekskylar barr. So [facility] [phone].    ATENCIÓN: Si habla español, tiene a arzate disposición servicios gratuitos de asistencia lingüística. Jonesame al [phone].    We comply with applicable federal civil rights laws and [place] laws. We do not discriminate on the basis of race, color, national origin, age, disability, sex, sexual orientation, or gender identity.            Thank you!     Thank you for choosing [facility]  for your care. Our goal is always to provide you with excellent care. Hearing back from our patients is one way we can continue to improve our services. Please take a few minutes to complete the written survey that you may receive in the mail after your visit with us. Thank you!             Your Updated Medication List - Protect others around you: Learn how to safely use, store and throw away your medicines at www.disposemymeds.org.          This list is accurate as of 4/12/18  4:56 PM.  Always use your most recent med list.                   Brand Name Dispense Instructions for use Diagnosis    amLODIPine-benazepril 5-10 MG per capsule    LOTREL    90 capsule    Take 1 capsule by mouth daily    Hypertension goal BP (blood pressure) < 140/90       aspirin 81 MG tablet     100 Tab    take 1 Tab by mouth daily.    Essential hypertension, benign       cetirizine 10 MG tablet    ZYRTEC    90 tablet    Take 1 tablet (10 mg) by mouth daily    Seasonal allergic rhinitis due to pollen       fluticasone 50 MCG/ACT spray    FLONASE    16 g    Spray 2 sprays into both nostrils daily    Dizziness       LORazepam 1 MG tablet    ATIVAN    10 tablet    TAKE ONE TABLET BY MOUTH EVERY 8 HOURS AS NEEDED FOR SEVERE ANXIETY    Anxiety state

## 2018-04-12 NOTE — TELEPHONE ENCOUNTER
Requested Prescriptions   Pending Prescriptions Disp Refills     LORazepam (ATIVAN) 1 MG tablet 10 tablet 0     Sig: TAKE ONE TABLET BY MOUTH EVERY 8 HOURS AS NEEDED FOR SEVERE ANXIETY    There is no refill protocol information for this order        Last Written Prescription Date:  03/28/18  Last Fill Quantity: 10,  # refills: 0   Last office visit: 4/2/2018 with prescribing provider:   4/12/18  Future Office Visit:   Next 5 appointments (look out 90 days)     Apr 12, 2018  4:00 PM CDT   PHYSICAL with Jimmie Henriquez MD   [facility]    [address] [place] [id number] [phone]                 Appointment today    Lawanda Cárdenas RN BSN  [facility]  [phone]

## 2018-04-12 NOTE — TELEPHONE ENCOUNTER
Message from FilterEasyhart:  Original authorizing provider: MD Sebastián Arguello would like a refill of the following medications:  LORazepam (ATIVAN) 1 MG tablet [Jimmie Henriquez MD]    Preferred pharmacy: [facility] [address] [place] [address] [address]    Comment:

## 2018-04-13 ASSESSMENT — PATIENT HEALTH QUESTIONNAIRE - PHQ9: SUM OF ALL RESPONSES TO PHQ QUESTIONS 1-9: 2

## 2018-04-13 ASSESSMENT — ANXIETY QUESTIONNAIRES: GAD7 TOTAL SCORE: 3

## 2018-04-21 DIAGNOSIS — Z13.6 CARDIOVASCULAR SCREENING; LDL GOAL LESS THAN 130: ICD-10-CM

## 2018-04-21 DIAGNOSIS — I10 HYPERTENSION GOAL BP (BLOOD PRESSURE) < 140/90: ICD-10-CM

## 2018-04-21 DIAGNOSIS — K21.9 GASTROESOPHAGEAL REFLUX DISEASE, ESOPHAGITIS PRESENCE NOT SPECIFIED: ICD-10-CM

## 2018-04-21 DIAGNOSIS — Z12.5 SCREENING FOR PROSTATE CANCER: ICD-10-CM

## 2018-04-21 DIAGNOSIS — Z83.49 FAMILY HISTORY OF THYROID DISEASE: ICD-10-CM

## 2018-04-21 LAB
ALBUMIN SERPL-MCNC: 4 G/DL (ref 3.4–5)
ALP SERPL-CCNC: 83 U/L (ref 40–150)
ALT SERPL W P-5'-P-CCNC: 45 U/L (ref 0–70)
ANION GAP SERPL CALCULATED.3IONS-SCNC: 3 MMOL/L (ref 3–14)
AST SERPL W P-5'-P-CCNC: 32 U/L (ref 0–45)
BILIRUB SERPL-MCNC: 0.9 MG/DL (ref 0.2–1.3)
BUN SERPL-MCNC: 16 MG/DL (ref 7–30)
CALCIUM SERPL-MCNC: 8.9 MG/DL (ref 8.5–10.1)
CHLORIDE SERPL-SCNC: 108 MMOL/L (ref 94–109)
CHOLEST SERPL-MCNC: 186 MG/DL
CO2 SERPL-SCNC: 29 MMOL/L (ref 20–32)
CREAT SERPL-MCNC: 0.86 MG/DL (ref 0.66–1.25)
ERYTHROCYTE [DISTWIDTH] IN BLOOD BY AUTOMATED COUNT: 12.4 % (ref 10–15)
GFR SERPL CREATININE-BSD FRML MDRD: >90 ML/MIN/1.7M2
GLUCOSE SERPL-MCNC: 112 MG/DL (ref 70–99)
HCT VFR BLD AUTO: 42.2 % (ref 40–53)
HDLC SERPL-MCNC: 35 MG/DL
HGB BLD-MCNC: 14.5 G/DL (ref 13.3–17.7)
LDLC SERPL CALC-MCNC: 128 MG/DL
MCH RBC QN AUTO: 31.9 PG (ref 26.5–33)
MCHC RBC AUTO-ENTMCNC: 34.4 G/DL (ref 31.5–36.5)
MCV RBC AUTO: 93 FL (ref 78–100)
NONHDLC SERPL-MCNC: 151 MG/DL
PLATELET # BLD AUTO: 285 10E9/L (ref 150–450)
POTASSIUM SERPL-SCNC: 4.8 MMOL/L (ref 3.4–5.3)
PROT SERPL-MCNC: 7.4 G/DL (ref 6.8–8.8)
PSA SERPL-ACNC: 0.36 UG/L (ref 0–4)
RBC # BLD AUTO: 4.54 10E12/L (ref 4.4–5.9)
SODIUM SERPL-SCNC: 140 MMOL/L (ref 133–144)
TRIGL SERPL-MCNC: 116 MG/DL
TSH SERPL DL<=0.005 MIU/L-ACNC: 1.41 MU/L (ref 0.4–4)
WBC # BLD AUTO: 7.1 10E9/L (ref 4–11)

## 2018-04-21 PROCEDURE — 84443 ASSAY THYROID STIM HORMONE: CPT | Performed by: FAMILY MEDICINE

## 2018-04-21 PROCEDURE — 80061 LIPID PANEL: CPT | Performed by: FAMILY MEDICINE

## 2018-04-21 PROCEDURE — 80053 COMPREHEN METABOLIC PANEL: CPT | Performed by: FAMILY MEDICINE

## 2018-04-21 PROCEDURE — G0103 PSA SCREENING: HCPCS | Performed by: FAMILY MEDICINE

## 2018-04-21 PROCEDURE — 85027 COMPLETE CBC AUTOMATED: CPT | Performed by: FAMILY MEDICINE

## 2018-04-21 PROCEDURE — 36415 COLL VENOUS BLD VENIPUNCTURE: CPT | Performed by: FAMILY MEDICINE

## 2018-04-25 NOTE — PROGRESS NOTES
Dear Sebastián,    Here is a summary of your recent test results:  -Liver and gallbladder tests (ALT,AST, Alk phos,bilirubin) are normal.  -Kidney function (GFR) is normal.  -Sodium is normal.  -Potassium is normal.  -Glucose is slight elevated and may be sign of early diabetes (prediabetes). ADVISE:: low carbohydrate diet, exercise, try to lose weight (if necessary) and recheck glucose in 6 months. (GLU,A1C, DX: prediabetes)  -LDL(bad) cholesterol level is elevated,  A diet high in fat and simple carbohydrates, genetics and being overweight can contribute to this. ADVISE: Exercise, a low fat, low carbohydrate diet and weight control are helpful to improve this.  Rechecking your fasting cholesterol panel in 6 months is recommended (Lipid w/ LDL reflex, DX:hyperlipidemia)  -PSA (prostate specific antigen) test is normal.  This indicates a low likelihood of prostate cancer.  ADVISE: yearly recheck.   -TSH (thyroid stimulating hormone) level is normal which indicates normal thyroid function.  -Normal red blood cell (hgb) levels, normal white blood cell count and normal platelet levels.    For additional lab test information, labtestsonline.org is an excellent reference.    In addition, here is a list of due or overdue Health Maintenance reminders:  Colon Cancer Screening - FIT Test - yearly due on 04/28/2018    Please call us at [phone] (or use Brightstorm) to address the above recommendations if needed.           Thank you very much for trusting me and [facility].     Healthy regards,  Moe Henriquez MD

## 2019-01-03 ENCOUNTER — MYC REFILL (OUTPATIENT)
Dept: FAMILY MEDICINE | Facility: CLINIC | Age: 54
End: 2019-01-03

## 2019-01-03 DIAGNOSIS — F41.1 ANXIETY STATE: ICD-10-CM

## 2019-01-04 NOTE — TELEPHONE ENCOUNTER
LORazepam (ATIVAN) 1 MG tablet        Last Written Prescription Date:  4.12.18  Last Fill Quantity: 10,  # refills: 0   Last Office Visit: 4/12/2018   Future Office Visit:         Routing refill request to provider for review/approval because:  Drug not on the [facility], [facility] or [facility] refill protocol or controlled substance

## 2019-01-04 NOTE — TELEPHONE ENCOUNTER
Routing refill request to provider for review/approval because:  Drug not on the [facility] refill protocol   Bhavna Stephens RN  [facility]

## 2019-01-07 RX ORDER — LORAZEPAM 1 MG/1
TABLET ORAL
Qty: 10 TABLET | Refills: 0 | Status: SHIPPED | OUTPATIENT
Start: 2019-01-07 | End: 2019-07-31

## 2019-01-07 NOTE — TELEPHONE ENCOUNTER
I printed the prescription(s) and will sign when I return to the office.  Then I will place them in the [facility].

## 2019-04-14 DIAGNOSIS — I10 HYPERTENSION GOAL BP (BLOOD PRESSURE) < 140/90: ICD-10-CM

## 2019-04-15 RX ORDER — AMLODIPINE AND BENAZEPRIL HYDROCHLORIDE 5; 10 MG/1; MG/1
CAPSULE ORAL
Qty: 30 CAPSULE | Refills: 0 | Status: SHIPPED | OUTPATIENT
Start: 2019-04-15 | End: 2019-05-20

## 2019-04-15 NOTE — TELEPHONE ENCOUNTER
"Requested Prescriptions   Pending Prescriptions Disp Refills     amLODIPine-benazepril (LOTREL) 5-10 MG capsule [Pharmacy Med Name: AMLOD/BENAZP 5-10MG  CAP]    Last Written Prescription Date:  4.12.18  Last Fill Quantity: 90 capsule,  # refills: 3   Last office visit: 4/12/2018 with prescribing provider:  Jimmie Henriquez MD       Future Office Visit:       30 capsule 11     Sig: TAKE 1 CAPSULE BY MOUTH ONCE DAILY       Calcium Channel Blockers Protocol  Failed - 4/14/2019  4:52 PM        Failed - Blood pressure under 140/90 in past 12 months     BP Readings from Last 3 Encounters:   04/12/18 132/76   04/02/18 148/82   03/28/17 130/80                 Failed - Recent (12 mo) or future (30 days) visit within the authorizing provider's specialty     Patient had office visit in the last 12 months or has a visit in the next 30 days with authorizing provider or within the authorizing provider's specialty.  See \"Patient Info\" tab in inbasket, or \"Choose Columns\" in Meds & Orders section of the refill encounter.              Passed - Medication is active on med list        Passed - Patient is age 18 or older        Passed - Normal serum creatinine on file in past 12 months     Recent Labs   Lab Test 04/21/18  1032   CR 0.86             "

## 2019-04-15 NOTE — TELEPHONE ENCOUNTER
Due for an Office visit for further refills, only fill for 30 days     Mojgan Harrison RN, BSN  [facility]

## 2019-05-15 DIAGNOSIS — R42 DIZZINESS: ICD-10-CM

## 2019-05-15 RX ORDER — FLUTICASONE PROPIONATE 50 MCG
SPRAY, SUSPENSION (ML) NASAL
Qty: 16 ML | Refills: 3 | Status: SHIPPED | OUTPATIENT
Start: 2019-05-15

## 2019-05-15 NOTE — TELEPHONE ENCOUNTER
"Requested Prescriptions   Pending Prescriptions Disp Refills     fluticasone (FLONASE) 50 MCG/ACT nasal spray [Pharmacy Med Name: FLUTICASONE 50MCG   SPR]      Last Written Prescription Date:  4.12.18  Last Fill Quantity: 16 g,  # refills: 3   Last office visit: 4/12/2018 with prescribing provider:  Jimmie Henriquez MD       Future Office Visit:   Next 5 appointments (look out 90 days)    May 20, 2019  6:00 PM CDT  Sanjiv Arndt with Rosanna Castillo PA-C  [facility] [address] [place] [id number]  [phone]             3     Sig: INHALE 2 SPRAY(S) IN EACH NOSTRIL ONCE DAILY       Inhaled Steroids Protocol Failed - 5/15/2019  9:06 AM        Failed - Asthma control assessment score within normal limits in last 6 months     Please review ACT score.     No flowsheet data found.            Passed - Patient is age 12 or older        Passed - Medication is active on med list        Passed - Recent (6 mo) or future (30 days) visit within the authorizing provider's specialty     Patient had office visit in the last 6 months or has a visit in the next 30 days with authorizing provider or within the authorizing provider's specialty.  See \"Patient Info\" tab in inbasket, or \"Choose Columns\" in Meds & Orders section of the refill encounter.            "

## 2019-05-20 ENCOUNTER — OFFICE VISIT (OUTPATIENT)
Dept: FAMILY MEDICINE | Facility: CLINIC | Age: 54
End: 2019-05-20
Payer: COMMERCIAL

## 2019-05-20 VITALS
TEMPERATURE: 98.7 F | HEART RATE: 107 BPM | BODY MASS INDEX: 29.89 KG/M2 | WEIGHT: 186 LBS | OXYGEN SATURATION: 96 % | HEIGHT: 66 IN | SYSTOLIC BLOOD PRESSURE: 144 MMHG | DIASTOLIC BLOOD PRESSURE: 80 MMHG

## 2019-05-20 DIAGNOSIS — F41.1 ANXIETY STATE: ICD-10-CM

## 2019-05-20 DIAGNOSIS — D22.9 MULTIPLE PIGMENTED NEVI: Primary | ICD-10-CM

## 2019-05-20 DIAGNOSIS — I10 HYPERTENSION GOAL BP (BLOOD PRESSURE) < 140/90: ICD-10-CM

## 2019-05-20 PROCEDURE — 82043 UR ALBUMIN QUANTITATIVE: CPT | Performed by: PHYSICIAN ASSISTANT

## 2019-05-20 PROCEDURE — 80048 BASIC METABOLIC PNL TOTAL CA: CPT | Performed by: PHYSICIAN ASSISTANT

## 2019-05-20 PROCEDURE — 99213 OFFICE O/P EST LOW 20 MIN: CPT | Performed by: PHYSICIAN ASSISTANT

## 2019-05-20 PROCEDURE — 36415 COLL VENOUS BLD VENIPUNCTURE: CPT | Performed by: PHYSICIAN ASSISTANT

## 2019-05-20 RX ORDER — AMLODIPINE AND BENAZEPRIL HYDROCHLORIDE 5; 10 MG/1; MG/1
CAPSULE ORAL
Qty: 30 CAPSULE | Refills: 3 | Status: SHIPPED | OUTPATIENT
Start: 2019-05-20

## 2019-05-20 RX ORDER — ALBUTEROL SULFATE 90 UG/1
1-2 AEROSOL, METERED RESPIRATORY (INHALATION)
COMMUNITY
Start: 2019-02-14

## 2019-05-20 ASSESSMENT — MIFFLIN-ST. JEOR: SCORE: 1626.44

## 2019-05-20 NOTE — PROGRESS NOTES
"  SUBJECTIVE:                                                    Diaz Sotelo is a 53 year old male who presents to clinic today for the following health issues:      Hypertension Follow-up      Do you check your blood pressure regularly outside of the clinic? No     Are you following a low salt diet? Yes tries to eat healthy    Are your blood pressures ever more than 140 on the top number (systolic) OR more     than 90 on the bottom number (diastolic), for example 140/90? Yes  - believes last time was    Amount of exercise or physical activity: No routine 6-8 thousand steps per day at work    Problems taking medications regularly: No    Medication side effects: believes that weight has been increasing since taking BP med    Diet: regular (no restrictions)      Patient admits that he is anxious to be here and with a different provider.  He says that his son is graduating and his father-in-law was put in a transition facility, he had surgery last week and it didn't go as planned.      Problem list and histories reviewed & adjusted, as indicated.  Additional history: as documented      ROS:  Constitutional, HEENT, cardiovascular, pulmonary, GI, , musculoskeletal, neuro, skin, endocrine and psych systems are negative, except as otherwise noted.    OBJECTIVE:                                                    /80   Pulse 107   Temp 98.7  F (37.1  C) (Oral)   Ht 1.676 m (5' 6\")   Wt 84.4 kg (186 lb)   SpO2 96%   BMI 30.02 kg/m    Body mass index is 30.02 kg/m .  GENERAL: healthy, alert and no distress  EYES: Eyes grossly normal to inspection, PERRL and conjunctivae and sclerae normal  RESP: lungs clear to auscultation - no rales, rhonchi or wheezes  CV: regular rate and rhythm, normal S1 S2, no S3 or S4, no murmur, click or rub, no peripheral edema and peripheral pulses strong  MS: no gross musculoskeletal defects noted, no edema  NEURO: Normal strength and tone, mentation intact and speech normal  PSYCH: " mentation appears normal, affect normal/bright    Diagnostic Test Results:  Labs - pending results today     ASSESSMENT/PLAN:                                                      Diaz was seen today for recheck medication.    Diagnoses and all orders for this visit:    Multiple pigmented nevi  -     DERMATOLOGY REFERRAL    Hypertension goal BP (blood pressure) < 140/90  -     Basic metabolic panel  (Ca, Cl, CO2, Creat, Gluc, K, Na, BUN)  -     Albumin Random Urine Quantitative with Creat Ratio  -     amLODIPine-benazepril (LOTREL) 5-10 MG capsule; TAKE 1 CAPSULE BY MOUTH ONCE DAILY    Anxiety state      Blood pressure is mildly elevated today, but patient is anxious in clinic and states he does not feel it is high on a daily basis.  He agrees to followup within the week for a nurse only BP check or a BP check in the pharmacy.  Patient is otherwise feeling good and denies any symptoms of elevated BP.      Followup: Return in about 1 week (around 5/27/2019) for BP Recheck at our pharmacy.  .     -- I have discussed the patient's diagnosis, and my plan of treatment with the patient and/or family. Patient is aware to followup if symptoms do not improve.  Patient has been advised to be seen sooner or seek more immediate care if symptoms change or worsen.  Patient agrees with and understands the plan today.     See Patient Instructions        Rosanna Castillo PA-C    [facility] [place]

## 2019-05-21 LAB
ANION GAP SERPL CALCULATED.3IONS-SCNC: 8 MMOL/L (ref 3–14)
BUN SERPL-MCNC: 13 MG/DL (ref 7–30)
CALCIUM SERPL-MCNC: 9.1 MG/DL (ref 8.5–10.1)
CHLORIDE SERPL-SCNC: 106 MMOL/L (ref 94–109)
CO2 SERPL-SCNC: 27 MMOL/L (ref 20–32)
CREAT SERPL-MCNC: 0.92 MG/DL (ref 0.66–1.25)
CREAT UR-MCNC: 31 MG/DL
GFR SERPL CREATININE-BSD FRML MDRD: >90 ML/MIN/{1.73_M2}
GLUCOSE SERPL-MCNC: 115 MG/DL (ref 70–99)
MICROALBUMIN UR-MCNC: 5 MG/L
MICROALBUMIN/CREAT UR: 16.77 MG/G CR (ref 0–17)
POTASSIUM SERPL-SCNC: 3.8 MMOL/L (ref 3.4–5.3)
SODIUM SERPL-SCNC: 141 MMOL/L (ref 133–144)

## 2019-05-23 NOTE — RESULT ENCOUNTER NOTE
Ambrosio Lowery ,    The results from your recent lab work are within normal limits.    -All of your labs are normal.    -The glucose was not a fasting sample.      Thank you for choosing [facility] for your health care needs,      Rosanna Castillo PA-C

## 2019-07-31 DIAGNOSIS — F41.1 ANXIETY STATE: ICD-10-CM

## 2019-07-31 NOTE — TELEPHONE ENCOUNTER
Routing refill request to provider for review/approval because:  Drug not on the [facility] refill protocol     Health Maintenance Due   Topic Date Due     ADVANCE CARE PLANNING  1965     HIV SCREENING  06/28/1979     ZOSTER IMMUNIZATION (1 of 2) 06/28/2014     FIT  04/28/2018     PHQ-2  01/01/2019     PREVENTIVE CARE VISIT  04/12/2019     PSA  04/21/2019     Mukund Laguna RN   [facility]

## 2019-07-31 NOTE — TELEPHONE ENCOUNTER
Outpatient Medication Detail    Disp Refills Start End DALJIT   LORazepam (ATIVAN) 1 MG tablet 10 tablet 0 1/7/2019  No   Sig: TAKE ONE TABLET BY MOUTH EVERY 8 HOURS AS NEEDED FOR SEVERE ANXIETY     Problem List Complete:  No     PROVIDER TO CONSIDER COMPLETION OF PROBLEM LIST AND OVERVIEW/CONTROLLED SUBSTANCE AGREEMENT    Last Office Visit with [facility] primary care provider: 05/20/2019    Future Office visit:     Controlled substance agreement:   Encounter-Level CSA:    There are no encounter-level csa.     Patient-Level CSA:    There are no patient-level csa.         Last Urine Drug Screen: No results found for: CDAUT, No results found for: COMDAT, No results found for: THC13, PCP13, COC13, MAMP13, OPI13, AMP13, BZO13, TCA13, MTD13, BAR13, OXY13, PPX13, BUP13     Processing:  [facility]     [email or web address]          Routing refill request to provider for review/approval because:  Drug not on the [facility] refill protocol         Hermelinda Tucker RN  [facility]

## 2019-08-01 RX ORDER — LORAZEPAM 1 MG/1
TABLET ORAL
Qty: 10 TABLET | Refills: 0 | Status: SHIPPED | OUTPATIENT
Start: 2019-08-01

## 2019-08-01 NOTE — TELEPHONE ENCOUNTER
Prescription(s) signed and in the [facility].  Please process and notify the patient, if needed.

## 2020-02-10 ENCOUNTER — HEALTH MAINTENANCE LETTER (OUTPATIENT)
Age: 55
End: 2020-02-10

## 2020-11-16 ENCOUNTER — HEALTH MAINTENANCE LETTER (OUTPATIENT)
Age: 55
End: 2020-11-16

## 2021-04-03 ENCOUNTER — HEALTH MAINTENANCE LETTER (OUTPATIENT)
Age: 56
End: 2021-04-03

## 2021-09-18 ENCOUNTER — HEALTH MAINTENANCE LETTER (OUTPATIENT)
Age: 56
End: 2021-09-18

## 2022-04-30 ENCOUNTER — HEALTH MAINTENANCE LETTER (OUTPATIENT)
Age: 57
End: 2022-04-30

## 2022-11-19 ENCOUNTER — HEALTH MAINTENANCE LETTER (OUTPATIENT)
Age: 57
End: 2022-11-19

## 2023-05-03 NOTE — TELEPHONE ENCOUNTER
Patient portal message sent. Looking for updated BP log.   Prescription faxed to [facility].      Giovanna Mckeon

## 2023-06-01 ENCOUNTER — HEALTH MAINTENANCE LETTER (OUTPATIENT)
Age: 58
End: 2023-06-01